# Patient Record
Sex: FEMALE | Race: WHITE
[De-identification: names, ages, dates, MRNs, and addresses within clinical notes are randomized per-mention and may not be internally consistent; named-entity substitution may affect disease eponyms.]

---

## 2017-08-31 ENCOUNTER — HOSPITAL ENCOUNTER (EMERGENCY)
Dept: HOSPITAL 61 - ER | Age: 27
Discharge: HOME | End: 2017-08-31
Payer: SELF-PAY

## 2017-08-31 VITALS — DIASTOLIC BLOOD PRESSURE: 81 MMHG | SYSTOLIC BLOOD PRESSURE: 126 MMHG

## 2017-08-31 VITALS — BODY MASS INDEX: 30.53 KG/M2 | HEIGHT: 66 IN | WEIGHT: 190 LBS

## 2017-08-31 DIAGNOSIS — Z88.6: ICD-10-CM

## 2017-08-31 DIAGNOSIS — G43.909: ICD-10-CM

## 2017-08-31 DIAGNOSIS — Z88.2: ICD-10-CM

## 2017-08-31 DIAGNOSIS — J45.909: ICD-10-CM

## 2017-08-31 DIAGNOSIS — Z88.8: ICD-10-CM

## 2017-08-31 DIAGNOSIS — N39.0: ICD-10-CM

## 2017-08-31 DIAGNOSIS — Z88.1: ICD-10-CM

## 2017-08-31 DIAGNOSIS — J06.9: Primary | ICD-10-CM

## 2017-08-31 LAB
BACTERIA #/AREA URNS HPF: (no result) /HPF
BILIRUB UR QL STRIP: NEGATIVE
GLUCOSE UR STRIP-MCNC: NEGATIVE MG/DL
NITRITE UR QL STRIP: NEGATIVE
PH UR STRIP: 6 [PH]
PROT UR STRIP-MCNC: NEGATIVE MG/DL
RBC #/AREA URNS HPF: (no result) /HPF (ref 0–2)
SP GR UR STRIP: 1.02
SQUAMOUS #/AREA URNS LPF: (no result) /LPF
UROBILINOGEN UR-MCNC: 0.2 MG/DL
WBC #/AREA URNS HPF: (no result) /HPF (ref 0–4)

## 2017-08-31 PROCEDURE — 70450 CT HEAD/BRAIN W/O DYE: CPT

## 2017-08-31 PROCEDURE — 87086 URINE CULTURE/COLONY COUNT: CPT

## 2017-08-31 PROCEDURE — 81001 URINALYSIS AUTO W/SCOPE: CPT

## 2017-08-31 PROCEDURE — 99285 EMERGENCY DEPT VISIT HI MDM: CPT

## 2017-08-31 PROCEDURE — 81025 URINE PREGNANCY TEST: CPT

## 2017-08-31 PROCEDURE — 94250: CPT

## 2017-08-31 PROCEDURE — 94640 AIRWAY INHALATION TREATMENT: CPT

## 2017-08-31 NOTE — PHYS DOC
Past Medical History


Past Medical History:  Asthma, UTI, Other


Additional Past Medical Histor:  MOOD DISORDER, TREMOR R ARM, TACHYCARDIA


Past Surgical History:  No Surgical History


Alcohol Use:  Occasionally


Drug Use:  None





Adult General


Chief Complaint


Chief Complaint:  BACK PAIN - NO INJURY





[facility]


HPI





Patient is a 27  year old female presents to the emergency department stating 

that she has been having a headache on and off for the last month. She states 

that she's been having a migraine. She also states that whenever she bends her 

head down that she feels as though her vision goes completely dark. She denies 

any photophobia. She does state that she has an area on her left parietal area 

feels numb and tingly occasionally. Patient also states that she's been having 

bilateral back pain for the last 2 weeks. She denies any urinary frequency 

urgency or pain with urination denies any blood in her urine. Denies any foul 

odor. She denies any fever, chills or any nausea vomiting. Patient states she's 

been taken over-the-counter medications for the headache and the pain.





Review of Systems


Review of Systems





Constitutional: Denies fever or chills []


Eyes: Denies change in visual acuity, redness, or eye pain []


HENT:  nasal congestion denies sore throat []


Respiratory:  cough denies shortness of breath []


Cardiovascular: No additional information not addressed in HPI []


GI: Denies abdominal pain, nausea, vomiting, bloody stools or diarrhea []


: Denies dysuria or hematuria []


Musculoskeletal: Bilateral flank back pain denies joint pain []


Integument: Denies rash or skin lesions []


Neurologic: Denies headache, focal weakness or sensory changes []


Endocrine: Denies polyuria or polydipsia []





Current Medications


Current Medications





Current Medications








 Medications


  (Trade)  Dose


 Ordered  Sig/Grecia  Start Time


 Stop Time Status Last Admin


Dose Admin


 


 Albuterol/


 Ipratropium


  (Duoneb)  3 ml  1X  ONCE  17 10:00


 17 10:01 DC 17 10:01


3 ML











Allergies


Allergies





Allergies








Coded Allergies Type Severity Reaction Last Updated Verified


 


  Sulfa (Sulfonamide Antibiotics) Allergy Intermediate RASH 17 Yes


 


  levofloxacin Allergy Intermediate HIVES 17 Yes


 


  promethazine Allergy Mild NAUSEA 17 Yes


 


  ibuprofen Allergy Unknown  17 Yes











Physical Exam


Physical Exam





Constitutional: Well developed, well nourished, no acute distress, non-toxic 

appearance. []


HENT: Normocephalic, atraumatic, bilateral external ears normal, oropharynx 

moist, no oral exudates, nose normal. Bilateral tympanic membranes appear to be 

normal. Throat appears to be without erythematous redness drainage or discharge 

noted. No frontal maxillary sinus tenderness noted.


Eyes: PERRLA, EOMI, conjunctiva normal, no discharge. [] 


Neck: Normal range of motion, no tenderness, supple, no stridor. [] 


Cardiovascular:Heart rate regular rhythm, no murmur []


Lungs & Thorax:  Bilateral breath sounds clear to auscultation []


Skin: Warm, dry, no erythema, no rash. [] 


Back: No tenderness, bilateral CVA tenderness. [] 


Extremities: No tenderness, no cyanosis, no clubbing, ROM intact, no edema. [] 


Neurologic: Alert and oriented X 3, normal motor function, normal sensory 

function, no focal deficits noted. []


Psychologic: Affect normal, judgement normal, mood normal. []





Current Patient Data


Vital Signs





 Vital Signs








  Date Time  Temp Pulse Resp B/P (MAP) Pulse Ox O2 Delivery O2 Flow Rate FiO2


 


17 10:03     99 Room Air  


 


17 09:39 98.4 98 18     





 98.4       








Lab Values





 Laboratory Tests








Test


  17


08:59 17


09:40


 


POC Urine HCG, Qualitative


  Hcg negative


(Negative) 


 


 


Urine Collection Type  Unknown  


 


Urine Color  Yellow  


 


Urine Clarity  Cloudy  


 


Urine pH  6.0  


 


Urine Specific Gravity  1.025  


 


Urine Protein


  


  Negative mg/dL


(NEG-TRACE)


 


Urine Glucose (UA)


  


  Negative mg/dL


(NEG)


 


Urine Ketones (Stick)


  


  15 mg/dL (NEG)


 


 


Urine Blood


  


  Negative (NEG)


 


 


Urine Nitrite


  


  Negative (NEG)


 


 


Urine Bilirubin


  


  Negative (NEG)


 


 


Urine Urobilinogen Dipstick


  


  0.2 mg/dL (0.2


mg/dL)


 


Urine Leukocyte Esterase


  


  Moderate (NEG)


 


 


Urine RBC


  


  1-2 /HPF (0-2)


 


 


Urine WBC


  


  5-10 /HPF


(0-4)


 


Urine Squamous Epithelial


Cells 


  Mod /LPF  


 


 


Urine Bacteria


  


  Moderate /HPF


(0-FEW)











EKG


EKG


[]





Radiology/Procedures


Radiology/Procedures


[][facility]


 [address] [place] [address] [phone]


 


 IMAGING REPORT





 Signed





PATIENT: MARIAMA HUDDLESTON ACCOUNT: [id number] MRN#: [id number]


: 1990 LOCATION: ER AGE: 27


SEX: F EXAM DT: 17 ACCESSION#: [id number]


STATUS: REG ER ORD. PHYSICIAN: AMAURI UNDERWOOD 


REASON: C/o visual problems with headaches


PROCEDURE: CT HEAD WO CONTRAST








CT scan of the head without contrast 2017





Clinical history: Headache with visual problems for one month.





Technique: Unenhanced, contiguous, 5 mm axial sections were obtained through


the head. 





One or more of the following individualized dose reduction techniques were


utilized for this study:





1. Automated exposure control.


2. Adjustment of the mA and/or kV according to patient size.


3. Use of iterative reconstruction technique.





Findings: The ventricles and sulci are within normal limits in size and


configuration.  No area of abnormal attenuation is involving the brain


parenchyma.  No extra-axial fluid collection is seen.  No skull fracture is


noted. 





Impression: Negative study.














DICTATED and SIGNED BY:     LYNDA LIVINGSTON MD


DATE:     17 1125





CC: AMAURI UNDERWOOD; NO PCP; NON,STAFF ~





Course & Med Decision Making


Course & Med Decision Making


Pertinent Labs and Imaging studies reviewed. (See chart for details)


Patient's urine was positive for urinary tract infection. Patient does have an 

upper respiratory infection as well. Patient also is complaining of a headache 

here in the emergency Department with CT scan being negative. Patient will be 

encouraged to rest and follow-up with her primary care physician. Patient be 

provided a prescription for prednisone to help with inflammation. Patient will 

be discharged home in stable condition signs and symptoms to return back to 

emergency department as been provided. Patient was also encouraged to follow-up 

with a neurologist in regards to her vision issues when she bends her head 

down. Patient agrees with discharge instructions, treatment regimens and follow-

up recommendations. All questions and concerns been answered at patient's 

bedside





Dragon Disclaimer


Dragon Disclaimer


This electronic medical record was generated, in whole or in part, using a 

voice recognition dictation system.





Departure


Departure


Impression:  


 Primary Impression:  


 URI (upper respiratory infection)


 Additional Impression:  


 UTI (urinary tract infection)


Disposition:   HOME, SELF-CARE


Condition:  STABLE


Referrals:  


NO PCP (PCP)


Patient Instructions:  Upper Respiratory Infection, Adult, Easy-to-Read, 

Urinary Tract Infection, Easy-to-Read





Additional Instructions:  


Activity as tolerated.


Medication as prescribed.


Follow-up with your primary care physician within the next week.


It is advisable that you follow-up with a neurologist in regards to your vision 

issues when you're pending her head.


Return back to emergency department sign symptoms of become worse.


Scripts


Prednisone (PREDNISONE) 20 Mg Tablet


40 MG PO DAILY for 7 Days, #14 TAB


   Prov: AMAURI UNDERWOOD         17 


Cephalexin (CEPHALEXIN) 500 Mg Tablet


1 TAB PO BID, #20 TAB


   Prov: AMAURI UNDERWOOD         17





Problem Qualifiers








 Primary Impression:  


 URI (upper respiratory infection)


 URI type:  unspecified URI  Qualified Codes:  J06.9 - Acute upper respiratory 

infection, unspecified


 Additional Impression:  


 UTI (urinary tract infection)


 Urinary tract infection type:  site unspecified  Hematuria presence:  without 

hematuria  Qualified Codes:  N39.0 - Urinary tract infection, site not specified








AMAURI UNDERWOOD Aug 31, 2017 09:53

## 2017-08-31 NOTE — RAD
CT scan of the head without contrast 8/31/2017



Clinical history: Headache with visual problems for one month.



Technique: Unenhanced, contiguous, 5 mm axial sections were obtained through

the head. 



One or more of the following individualized dose reduction techniques were

utilized for this study:



1. Automated exposure control.

2. Adjustment of the mA and/or kV according to patient size.

3. Use of iterative reconstruction technique.



Findings: The ventricles and sulci are within normal limits in size and

configuration.  No area of abnormal attenuation is involving the brain

parenchyma.  No extra-axial fluid collection is seen.  No skull fracture is

noted. 



Impression: Negative study.

## 2017-12-18 ENCOUNTER — HOSPITAL ENCOUNTER (EMERGENCY)
Dept: HOSPITAL 61 - ER | Age: 27
Discharge: HOME | End: 2017-12-18
Payer: SELF-PAY

## 2017-12-18 VITALS — WEIGHT: 185 LBS | BODY MASS INDEX: 29.73 KG/M2 | HEIGHT: 66 IN

## 2017-12-18 VITALS — SYSTOLIC BLOOD PRESSURE: 105 MMHG | DIASTOLIC BLOOD PRESSURE: 67 MMHG

## 2017-12-18 DIAGNOSIS — Z88.8: ICD-10-CM

## 2017-12-18 DIAGNOSIS — Z87.440: ICD-10-CM

## 2017-12-18 DIAGNOSIS — F41.9: ICD-10-CM

## 2017-12-18 DIAGNOSIS — Z88.6: ICD-10-CM

## 2017-12-18 DIAGNOSIS — Z88.2: ICD-10-CM

## 2017-12-18 DIAGNOSIS — J09.X2: Primary | ICD-10-CM

## 2017-12-18 DIAGNOSIS — J45.909: ICD-10-CM

## 2017-12-18 DIAGNOSIS — Z88.1: ICD-10-CM

## 2017-12-18 DIAGNOSIS — F32.9: ICD-10-CM

## 2017-12-18 LAB
ALBUMIN SERPL-MCNC: 3.8 G/DL (ref 3.4–5)
ALBUMIN/GLOB SERPL: 1.1 {RATIO} (ref 1–1.7)
ALP SERPL-CCNC: 87 U/L (ref 46–116)
ALT SERPL-CCNC: 15 U/L (ref 14–59)
ANION GAP SERPL CALC-SCNC: 14 MMOL/L (ref 6–14)
AST SERPL-CCNC: 19 U/L (ref 15–37)
BASOPHILS # BLD AUTO: 0 X10^3/UL (ref 0–0.2)
BASOPHILS NFR BLD: 0 % (ref 0–3)
BILIRUB SERPL-MCNC: 0.5 MG/DL (ref 0.2–1)
BUN SERPL-MCNC: 6 MG/DL (ref 7–20)
BUN/CREAT SERPL: 6 (ref 6–20)
CALCIUM SERPL-MCNC: 8.5 MG/DL (ref 8.5–10.1)
CHLORIDE SERPL-SCNC: 105 MMOL/L (ref 98–107)
CO2 SERPL-SCNC: 22 MMOL/L (ref 21–32)
CREAT SERPL-MCNC: 1 MG/DL (ref 0.6–1)
EOSINOPHIL NFR BLD: 0 % (ref 0–3)
ERYTHROCYTE [DISTWIDTH] IN BLOOD BY AUTOMATED COUNT: 13.4 % (ref 11.5–14.5)
GFR SERPLBLD BASED ON 1.73 SQ M-ARVRAT: 67.6 ML/MIN
GLOBULIN SER-MCNC: 3.5 G/DL (ref 2.2–3.8)
GLUCOSE SERPL-MCNC: 90 MG/DL (ref 70–99)
HCT VFR BLD CALC: 41.4 % (ref 36–47)
HGB BLD-MCNC: 13.9 G/DL (ref 12–15.5)
LYMPHOCYTES # BLD: 0.7 X10^3/UL (ref 1–4.8)
LYMPHOCYTES NFR BLD AUTO: 9 % (ref 24–48)
MCH RBC QN AUTO: 29 PG (ref 25–35)
MCHC RBC AUTO-ENTMCNC: 34 G/DL (ref 31–37)
MCV RBC AUTO: 87 FL (ref 79–100)
MONOCYTES NFR BLD: 12 % (ref 0–9)
NEG OBC SER: (no result)
NEUTROPHILS NFR BLD AUTO: 78 % (ref 31–73)
OBC FLU: (no result)
PLATELET # BLD AUTO: 176 X10^3/UL (ref 140–400)
POS OBC SER: (no result)
POTASSIUM SERPL-SCNC: 3.8 MMOL/L (ref 3.5–5.1)
PROT SERPL-MCNC: 7.3 G/DL (ref 6.4–8.2)
RBC # BLD AUTO: 4.76 X10^6/UL (ref 3.5–5.4)
SODIUM SERPL-SCNC: 141 MMOL/L (ref 136–145)
WBC # BLD AUTO: 7.7 X10^3/UL (ref 4–11)

## 2017-12-18 PROCEDURE — 99284 EMERGENCY DEPT VISIT MOD MDM: CPT

## 2017-12-18 PROCEDURE — 84703 CHORIONIC GONADOTROPIN ASSAY: CPT

## 2017-12-18 PROCEDURE — 81025 URINE PREGNANCY TEST: CPT

## 2017-12-18 PROCEDURE — 96360 HYDRATION IV INFUSION INIT: CPT

## 2017-12-18 PROCEDURE — 87804 INFLUENZA ASSAY W/OPTIC: CPT

## 2017-12-18 PROCEDURE — 85025 COMPLETE CBC W/AUTO DIFF WBC: CPT

## 2017-12-18 PROCEDURE — 36415 COLL VENOUS BLD VENIPUNCTURE: CPT

## 2017-12-18 PROCEDURE — 80053 COMPREHEN METABOLIC PANEL: CPT

## 2017-12-18 NOTE — PHYS DOC
Past Medical History


Past Medical History:  Anxiety, Asthma, Depression, UTI, Other


Additional Past Medical Histor:  MOOD DISORDER, TREMOR R ARM, TACHYCARDIA


Past Surgical History:  No Surgical History


Alcohol Use:  Occasionally


Drug Use:  None





Adult General


Chief Complaint


Chief Complaint:  FLU SYMPTOM





HPI


HPI





Patient is a 27  year old female who presents with fever, sore throat, diffuse 

myalgias, 3 days. Temperature is 104.9 last evening. Patient last vomited 

earlier this morning. Denies dizziness lightheadedness. No abdominal pain. No 

other acute symptoms or complaints. Last menstrual period 10 days ago. Patient 

is not currently on birth control. []





Review of Systems


Review of Systems





His symptoms as per history of present illness. All other review symptoms are 

negative.


All other systems were reviewed and found to be within normal limits, except as 

documented in this note.





Current Medications


Current Medications





Current Medications








 Medications


  (Trade)  Dose


 Ordered  Sig/Grecia  Start Time


 Stop Time Status Last Admin


Dose Admin


 


 Morphine Sulfate  5 mg  1X  ONCE  12/18/17 17:45


 12/18/17 17:46 DC  


 


 


 Ondansetron HCl


  (Zofran)  4 mg  1X  ONCE  12/18/17 17:45


 12/18/17 17:46 DC  


 


 


 Sodium Chloride  1,000 ml @ 


 1,000 mls/hr  1X  ONCE  12/18/17 17:45


 12/18/17 18:44  12/18/17 17:45


1,000 MLS/HR











Allergies


Allergies





Allergies








Coded Allergies Type Severity Reaction Last Updated Verified


 


  Sulfa (Sulfonamide Antibiotics) Allergy Intermediate RASH 8/31/17 Yes


 


  levofloxacin Allergy Intermediate HIVES 8/31/17 Yes


 


  promethazine Allergy Mild NAUSEA 8/31/17 Yes


 


  ibuprofen Allergy Unknown  8/31/17 Yes











Physical Exam


Physical Exam





Constitutional: Well developed, well nourished,, uncomfortable and sick 

appearing. []


HENT: Normocephalic, atraumatic, bilateral external ears normal, oropharynx, 

mild pharyngeal erythema, no oral exudates, nose normal. []


Eyes: PERRLA, EOMI, conjunctiva normal, no discharge. [] 


Neck: Normal range of motion, no tenderness, supple, no stridor. [] 


Cardiovascular: Tachycardia[]


Lungs & Thorax:  Bilateral breath sounds clear to auscultation []


Abdomen: Bowel sounds normal, soft, no tenderness, no masses, no pulsatile 

masses. [] 


Skin: Warm, dry, no erythema, no rash. [] 


Back: No tenderness, no CVA tenderness. [] 


Extremities: No tenderness, no cyanosis, no clubbing, ROM intact, no edema. [] 


Neurologic: Alert and oriented X 3, normal motor function, normal sensory 

function, no focal deficits noted. []


Psychologic: Affect normal, judgement normal, mood normal. []





Current Patient Data


Vital Signs





 Vital Signs








  Date Time  Temp Pulse Resp B/P (MAP) Pulse Ox O2 Delivery O2 Flow Rate FiO2


 


12/18/17 17:24 102.5 144 18 125/53 (77) 96 Room Air  





 102.5       








Lab Values





 Laboratory Tests








Test


  12/18/17


17:30 12/18/17


17:45 12/18/17


17:46


 


Influenza Type A Antigen


  Positive


(NEGATIVE) 


  


 


 


Influenza Type B Antigen


  Negative


(NEGATIVE) 


  


 


 


White Blood Count


  


  7.7 x10^3/uL


(4.0-11.0) 


 


 


Red Blood Count


  


  4.76 x10^6/uL


(3.50-5.40) 


 


 


Hemoglobin


  


  13.9 g/dL


(12.0-15.5) 


 


 


Hematocrit


  


  41.4 %


(36.0-47.0) 


 


 


Mean Corpuscular Volume


  


  87 fL ()


  


 


 


Mean Corpuscular Hemoglobin  29 pg (25-35)   


 


Mean Corpuscular Hemoglobin


Concent 


  34 g/dL


(31-37) 


 


 


Red Cell Distribution Width


  


  13.4 %


(11.5-14.5) 


 


 


Platelet Count


  


  176 x10^3/uL


(140-400) 


 


 


Neutrophils (%) (Auto)  78 % (31-73)  H 


 


Lymphocytes (%) (Auto)  9 % (24-48)  L 


 


Monocytes (%) (Auto)  12 % (0-9)  H 


 


Eosinophils (%) (Auto)  0 % (0-3)   


 


Basophils (%) (Auto)  0 % (0-3)   


 


Neutrophils # (Auto)


  


  6.0 x10^3uL


(1.8-7.7) 


 


 


Lymphocytes # (Auto)


  


  0.7 x10^3/uL


(1.0-4.8)  L 


 


 


Monocytes # (Auto)


  


  1.0 x10^3/uL


(0.0-1.1) 


 


 


Eosinophils # (Auto)


  


  0.0 x10^3/uL


(0.0-0.7) 


 


 


Basophils # (Auto)


  


  0.0 x10^3/uL


(0.0-0.2) 


 


 


POC Urine HCG, Qualitative


  


  


  Hcg negative


(Negative)





 Laboratory Tests


12/18/17 17:45














EKG


EKG


[]





Radiology/Procedures


Radiology/Procedures


[]





Course & Med Decision Making


Course & Med Decision Making


Pertinent Labs and Imaging studies reviewed. (See chart for details)





[Patient influenza positive. IV fluids pain medication given with symptomatic 

relief. Recommend continued supportive care and PCP follow-up. Return 

precautions reviewed. Patient verbalizes understanding agreement prior to 

departure.]





Dragon Disclaimer


Dragon Disclaimer


This electronic medical record was generated, in whole or in part, using a 

voice recognition dictation system.





Departure


Departure


Impression:  


 Primary Impression:  


 Influenza A


Disposition:  01 HOME, SELF-CARE


Condition:  GOOD


Referrals:  


NO PCP (PCP)


Patient Instructions:  Influenza A (H1N1)





Additional Instructions:  


You were evaluated in the emergency department for fever body aches nausea and 

vomiting. Lab work was obtained and positive for influenza A. Influenza is a 

viral illness that typically lasts between 3-5 days. Please go home and rest, 

take ibuprofen for pain and hydrocodone as needed for additional relief. Take 

nausea medication as directed. Please follow-up with your physician in 3-5 days 

if symptoms persist. In the meantime, if you develop new or worsening symptoms, 

please return to the emergency department.











VIRAJ MARLEY DO Dec 18, 2017 18:11

## 2018-05-05 ENCOUNTER — HOSPITAL ENCOUNTER (EMERGENCY)
Dept: HOSPITAL 63 - ER | Age: 28
Discharge: HOME | End: 2018-05-05
Payer: SELF-PAY

## 2018-05-05 VITALS — SYSTOLIC BLOOD PRESSURE: 106 MMHG | DIASTOLIC BLOOD PRESSURE: 78 MMHG

## 2018-05-05 VITALS — WEIGHT: 180 LBS | BODY MASS INDEX: 28.93 KG/M2 | HEIGHT: 66 IN

## 2018-05-05 DIAGNOSIS — S90.32XA: Primary | ICD-10-CM

## 2018-05-05 DIAGNOSIS — Z88.1: ICD-10-CM

## 2018-05-05 DIAGNOSIS — J45.909: ICD-10-CM

## 2018-05-05 DIAGNOSIS — Z88.8: ICD-10-CM

## 2018-05-05 DIAGNOSIS — Z88.2: ICD-10-CM

## 2018-05-05 DIAGNOSIS — F32.9: ICD-10-CM
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
## 2018-05-05 DIAGNOSIS — F41.9: ICD-10-CM
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
## 2018-05-05 DIAGNOSIS — Z88.6: ICD-10-CM

## 2018-05-05 PROCEDURE — 73630 X-RAY EXAM OF FOOT: CPT

## 2018-05-05 PROCEDURE — 99284 EMERGENCY DEPT VISIT MOD MDM: CPT

## 2018-05-05 NOTE — ED.ADGEN
Past History


Past Medical History:  Anxiety, Asthma, Depression


Past Surgical History:  No Surgical History


Alcohol Use:  Rarely


Drug Use:  None





Adult General


Chief Complaint


Chief Complaint


Foot injury





HPI


HPI





Patient is a pain tenderness and swelling over the dorsal surface of left 

midfoot v hitting her foot on the corner of her dresser last evening. Patient 

reports persistent pain. On exam, there is soft tissue swelling, punctate 

abrasion over impact region. No gross deformities, bony tenderness. Patient 

wearing flip-flops[]





Review of Systems


Review of Systems


ROS as per HPI. 


All other systems were reviewed and found to be within normal limits, except as 

documented in this note.





Current Medications


Current Medications





Current Medications








 Medications


  (Trade)  Dose


 Ordered  Sig/Grecia  Start Time


 Stop Time Status Last Admin


Dose Admin


 


 Acetaminophen


  (Tylenol)  1,000 mg  1X  ONCE  5/5/18 07:30


 5/5/18 07:31 DC  


 


 


 Ibuprofen


  (Motrin)  600 mg  1X  ONCE  5/5/18 07:00


 5/5/18 07:00 DC  


 











Allergies


Allergies





Allergies








Coded Allergies Type Severity Reaction Last Updated Verified


 


  Sulfa (Sulfonamide Antibiotics) Allergy Unknown  5/5/18 Yes


 


  ibuprofen Allergy Unknown  5/5/18 Yes


 


  levofloxacin Allergy Unknown  5/5/18 Yes


 


  promethazine Allergy Unknown  5/5/18 Yes











Physical Exam


Physical Exam





Constitutional: Well developed, well nourished, no acute distress, non-toxic 

appearance. []


Extremities: Left foot, dorsal surface, punctate abrasion with minimal soft 

tissue swelling, surrounding soft tissue tenderness. No deformity. Range of 

motion intact. []





Current Patient Data


Vital Signs





 Vital Signs








  Date Time  Temp Pulse Resp B/P (MAP) Pulse Ox O2 Delivery O2 Flow Rate FiO2


 


5/5/18 06:40 97.9 81 20  95 Room Air  











EKG


EKG


[]





Radiology/Procedures


Radiology/Procedures


[X-ray left foot: No obvious displaced fracture per radiology report]





Course & Med Decision Making


Course & Med Decision Making


Pertinent Labs and Imaging studies reviewed. (See chart for details)





[No obvious displaced fracture supportive care recommended]





Final Impression


Final Impression


[Left foot contusion]


Problems:  





Dragon Disclaimer


Dragon Disclaimer


This electronic medical record was generated, in whole or in part, using a 

voice recognition dictation system.











VIRAJ MARLEY DO May 5, 2018 10:15

## 2018-05-05 NOTE — RAD
Left foot radiographs

 

History: left foot pain x 1 day, hit foot on hard object

 

Comparison: None.

 

Findings:

3 views left foot are submitted. No acute fracture or dislocation is 

identified.

 

Impression: 

 

1.  No acute osseous abnormality is identified by radiographs.

 

Electronically signed by: Andrew Herron MD (5/5/2018 9:35 AM) [id number]

## 2018-10-11 ENCOUNTER — HOSPITAL ENCOUNTER (EMERGENCY)
Dept: HOSPITAL 61 - ER | Age: 28
LOS: 1 days | Discharge: HOME | End: 2018-10-12
Payer: SELF-PAY

## 2018-10-11 VITALS — DIASTOLIC BLOOD PRESSURE: 56 MMHG | SYSTOLIC BLOOD PRESSURE: 105 MMHG

## 2018-10-11 VITALS — BODY MASS INDEX: 29.73 KG/M2 | WEIGHT: 185 LBS | HEIGHT: 66 IN

## 2018-10-11 DIAGNOSIS — J45.909: ICD-10-CM

## 2018-10-11 DIAGNOSIS — Z3A.01: ICD-10-CM

## 2018-10-11 DIAGNOSIS — O99.341: ICD-10-CM

## 2018-10-11 DIAGNOSIS — Z88.6: ICD-10-CM

## 2018-10-11 DIAGNOSIS — Z88.2: ICD-10-CM

## 2018-10-11 DIAGNOSIS — Z88.8: ICD-10-CM

## 2018-10-11 DIAGNOSIS — F41.8: ICD-10-CM

## 2018-10-11 DIAGNOSIS — D72.829: ICD-10-CM

## 2018-10-11 DIAGNOSIS — O99.511: ICD-10-CM

## 2018-10-11 DIAGNOSIS — O03.9: Primary | ICD-10-CM

## 2018-10-11 PROCEDURE — 76856 US EXAM PELVIC COMPLETE: CPT

## 2018-10-11 PROCEDURE — 81001 URINALYSIS AUTO W/SCOPE: CPT

## 2018-10-11 PROCEDURE — 86850 RBC ANTIBODY SCREEN: CPT

## 2018-10-11 PROCEDURE — 86900 BLOOD TYPING SEROLOGIC ABO: CPT

## 2018-10-11 PROCEDURE — 76830 TRANSVAGINAL US NON-OB: CPT

## 2018-10-11 PROCEDURE — 84702 CHORIONIC GONADOTROPIN TEST: CPT

## 2018-10-11 PROCEDURE — 85025 COMPLETE CBC W/AUTO DIFF WBC: CPT

## 2018-10-11 PROCEDURE — 86901 BLOOD TYPING SEROLOGIC RH(D): CPT

## 2018-10-11 PROCEDURE — 80053 COMPREHEN METABOLIC PANEL: CPT

## 2018-10-11 PROCEDURE — 36415 COLL VENOUS BLD VENIPUNCTURE: CPT

## 2018-10-11 PROCEDURE — 85610 PROTHROMBIN TIME: CPT

## 2018-10-11 PROCEDURE — 99285 EMERGENCY DEPT VISIT HI MDM: CPT

## 2018-10-11 NOTE — PHYS DOC
Past Medical History


Past Medical History:  Anxiety, Asthma, Depression, UTI, Other


Additional Past Medical Histor:  MOOD DISORDER, TREMOR R ARM, TACHYCARDIA


Past Surgical History:  No Surgical History


Alcohol Use:  Occasionally


Drug Use:  None


Additional Procedures


Progress


Pelvic exam with chaperone RAQUEL Mckeon


Genitalia normal morphology with blood at the introitus


Exam with blood in the vault moderate clots. There are products of conception 

at the os. I successfully removed POC from the os with ring forceps and placed 

in a specimen container. Once


products of conception were removed there is no bleeding seen from the os.


Exam external os is open internal os is closed has mild uterine tenderness on 

palpation no adnexal masses or tenderness appreciated





Adult General


Chief Complaint


Chief Complaint:  VAGINAL BLEEDING PREGNANCY





[facility]


HPI





Patient is a 28  year old female who presents with pelvic pain and vaginal 

bleeding she is 6 weeks pregnant


She was seen at [facility] 2 days ago was given pills for elective 

. She started bleeding this evening at 5:00pm and his had severe 

bleeding since then. She's passed more than 20 large clots and is having 

profuse bleeding with pain. She presents for evaluation. Her blood type is O+ 

per her report.





Review of Systems


Review of Systems





Constitutional: Denies fever or chills 


Eyes: Denies change in visual acuity, redness, or eye pain 


HENT: Denies nasal congestion or sore throat 


Respiratory: Denies cough or shortness of breath 


Cardiovascular: No additional information not addressed in HPI 


GI: with lower abdominal pain, nausea, no vomiting, bloody stools or diarrhea 


: Denies dysuria or hematuria, with vaginal bleeding


Musculoskeletal: Denies back pain or joint pain 


Integument: Denies rash or skin lesions 


Neurologic: Denies headache, focal weakness or sensory changes 


Endocrine: Denies polyuria or polydipsia 





All other systems were reviewed and found to be within normal limits, except as 

documented in this note.





Current Medications


Current Medications





Current Medications








 Medications


  (Trade)  Dose


 Ordered  Sig/Grecia  Start Time


 Stop Time Status Last Admin


Dose Admin


 


 Acetaminophen


  (Tylenol)  1,000 mg  1X  ONCE  10/12/18 03:00


 10/12/18 03:01 DC 10/12/18 02:57


1,000 MG


 


 Sodium Chloride  1,000 ml @ 


 1,000 mls/hr  1X  ONCE  10/12/18 03:00


 10/12/18 03:59 DC 10/12/18 03:02


1,000 MLS/HR











Allergies


Allergies





Allergies








Coded Allergies Type Severity Reaction Last Updated Verified


 


  Sulfa (Sulfonamide Antibiotics) Allergy Intermediate RASH 17 Yes


 


  ibuprofen Allergy Intermediate  10/11/18 Yes


 


  levofloxacin Allergy Intermediate HIVES 17 Yes


 


  promethazine Allergy Mild NAUSEA 17 Yes











Physical Exam


Physical Exam





Constitutional: Well developed, well nourished, no acute distress, non-toxic 

appearance.


HENT: Normocephalic, atraumatic, bilateral external ears normal, oropharynx 

moist, no oral exudates, nose normal.


Eyes: PERRLA, EOMI, conjunctiva normal, no discharge. 


Neck: Normal range of motion, no tenderness, supple, no stridor.  


Cardiovascular:Heart rate regular rhythm, no murmur


Lungs & Thorax:  Bilateral breath sounds clear to auscultation


Abdomen: Bowel sounds normal, soft, no tenderness, no masses, no pulsatile 

masses.


Pelvic exam:


see procedure note


Skin: Warm, dry, no erythema, no rash.


Back: No tenderness, no CVA tenderness.


Extremities: No tenderness, no cyanosis, no clubbing, ROM intact, no edema. 


Neurologic: Alert and oriented X 3, normal motor function, normal sensory 

function, no focal deficits noted.


Psychologic: Affect normal, judgement normal, mood normal.





Current Patient Data


Vital Signs





 Vital Signs








  Date Time  Temp Pulse Resp B/P (MAP) Pulse Ox O2 Delivery O2 Flow Rate FiO2


 


10/11/18 22:54 98.0 116 20 105/56 (72) 100 Room Air  





 98.0       








Lab Values





 Laboratory Tests








Test


 10/12/18


00:18 10/12/18


02:58


 


White Blood Count


 18.6 x10^3/uL


(4.0-11.0)  H 





 


Red Blood Count


 4.62 x10^6/uL


(3.50-5.40) 





 


Hemoglobin


 14.0 g/dL


(12.0-15.5) 





 


Hematocrit


 41.0 %


(36.0-47.0) 





 


Mean Corpuscular Volume


 89 fL ()


 





 


Mean Corpuscular Hemoglobin 30 pg (25-35)   


 


Mean Corpuscular Hemoglobin


Concent 34 g/dL


(31-37) 





 


Red Cell Distribution Width


 13.2 %


(11.5-14.5) 





 


Platelet Count


 284 x10^3/uL


(140-400) 





 


Neutrophils (%) (Auto) 81 % (31-73)  H 


 


Lymphocytes (%) (Auto) 14 % (24-48)  L 


 


Monocytes (%) (Auto) 5 % (0-9)   


 


Eosinophils (%) (Auto) 0 % (0-3)   


 


Basophils (%) (Auto) 0 % (0-3)   


 


Neutrophils # (Auto)


 15.0 x10^3uL


(1.8-7.7)  H 





 


Lymphocytes # (Auto)


 2.5 x10^3/uL


(1.0-4.8) 





 


Monocytes # (Auto)


 1.0 x10^3/uL


(0.0-1.1) 





 


Eosinophils # (Auto)


 0.0 x10^3/uL


(0.0-0.7) 





 


Basophils # (Auto)


 0.1 x10^3/uL


(0.0-0.2) 





 


Prothrombin Time


 13.3 SEC


(11.7-14.0) 





 


Prothrombin Time INR 1.1 (0.8-1.1)   


 


Maternal Serum HCG Beta


Subunit 666298 mIU/mL


(0-5)  H 





 


Sodium Level


 139 mmol/L


(136-145) 





 


Potassium Level


 3.9 mmol/L


(3.5-5.1) 





 


Chloride Level


 102 mmol/L


() 





 


Carbon Dioxide Level


 24 mmol/L


(21-32) 





 


Anion Gap 13 (6-14)   


 


Blood Urea Nitrogen


 10 mg/dL


(7-20) 





 


Creatinine


 0.9 mg/dL


(0.6-1.0) 





 


Estimated GFR


(Cockcroft-Gault) 75.7  


 





 


BUN/Creatinine Ratio 11 (6-20)   


 


Glucose Level


 99 mg/dL


(70-99) 





 


Calcium Level


 9.7 mg/dL


(8.5-10.1) 





 


Total Bilirubin


 0.7 mg/dL


(0.2-1.0) 





 


Aspartate Amino Transferase


(AST) 21 U/L (15-37)


 





 


Alanine Aminotransferase (ALT)


 20 U/L (14-59)


 





 


Alkaline Phosphatase


 85 U/L


() 





 


Total Protein


 7.1 g/dL


(6.4-8.2) 





 


Albumin


 3.7 g/dL


(3.4-5.0) 





 


Albumin/Globulin Ratio 1.1 (1.0-1.7)   


 


Urine Collection Type  Unknown  


 


Urine Color  Red  


 


Urine Clarity  Bloody  


 


Urine pH  5.5  


 


Urine Specific Gravity  >=1.030  


 


Urine Protein


 


 >=300 mg/dL


(NEG-TRACE)


 


Urine Glucose (UA)


 


 Negative mg/dL


(NEG)


 


Urine Ketones (Stick)


 


 >=80 mg/dL


(NEG)


 


Urine Blood  Large (NEG)  


 


Urine Nitrite


 


 Negative (NEG)





 


Urine Bilirubin  Small (NEG)  


 


Urine Urobilinogen Dipstick


 


 1.0 mg/dL (0.2


mg/dL)


 


Urine Leukocyte Esterase  Trace (NEG)  


 


Urine RBC


 


 Tntc /HPF


(0-2)


 


Urine WBC


 


 1-4 /HPF (0-4)





 


Urine Squamous Epithelial


Cells 


 Occ /LPF  





 


Urine Bacteria


 


 0 /HPF (0-FEW)








 Laboratory Tests


10/12/18 00:18








 Laboratory Tests


10/12/18 00:18











EKG


EKG


[]





Radiology/Procedures


Radiology/Procedures


 [facility]


 [address]  [place], [place] [id number] [phone]


 


 IMAGING REPORT





 Signed





PATIENT: MARIAMA HUDDLESTON ACCOUNT: [id number] MRN#: [id number]


: 1990 LOCATION: ER AGE: 28


SEX: F EXAM DT: 10/11/18 ACCESSION#: [id number]


STATUS: REG ER ORD. PHYSICIAN: NORA MILNER MD 


REASON: rule out retained products of conception


PROCEDURE: PELVIS W/TV





Pelvic ultrasound with transvaginal:


 


Reason for examination: Heavy bleeding after taking second  pill 


today.


 


Transabdominal and transvaginal ultrasound examination of the pelvis was 


performed.


 


Transabdominally, the uterus shows no focal lesions. Endometrium is 


thickened. There is a corpus luteal cyst at the left ovary measuring 


approximately 2.4 cm in size.


 


Transvaginally, the uterus measures 9.5 x 4.7 x 5.8 cm in greatest 


dimension. Endometrium is thickened at 3 cm and contains blood flow. Right


ovary measures 2.6 x 2.0 x 1.9 cm in greatest dimension and shows normal 


blood flow. Left ovary measures 3.7 x 2.6 x 3.0 cm in greatest dimension 


and appears to contain a 2 x 1.8 x 1.6 cm corpus luteal cyst. No free 


fluid is seen.


 


IMPRESSION:


 


Thickened endometrium with blood flow to the endometrium. 2 cm corpus 


luteal cyst the left ovary.


 


Electronically signed by: Micyk Tee MD (10/12/2018 12:42 AM) [id number]














DICTATED and SIGNED BY:     MICKY TEE MD


DATE:     10/12/18 0037





Course & Med Decision Making


Course & Med Decision Making


Pertinent Labs and Imaging studies reviewed. (See chart for details)





Emergency Department Course


Patient presents with vaginal bleeding and cramping


DDx- incomplete , vaginal bleeding, retained POC


 


Patient was stable in the ED. POC removed with vaginal exam. Pelvic U/S showed 

no retained POC. Labs remarkable for leukocytosis. Blood type O+. 


Clinically patient has had complete .





02:25


Case discussed with Dr. Caldwell who recommends office follow-up. Patient 

to return to the ED if she develops worse bleeding, return to the ED.





04:00


U/A unremarkable. Patient improved with decreased cramping and bleeding. 

Patient will follow-up with outpatient Gyn evaluation for further evaluation 

and serial BHCG testing. Patient advised to call Gyn office today for follow-up.





Dragon Disclaimer


Dragon Disclaimer


This electronic medical record was generated, in whole or in part, using a 

voice recognition dictation system.





Departure


Departure


Impression:  


 Primary Impression:  


 Complete 


 Additional Impressions:  


 Abnormal vaginal bleeding


 Leukocytosis


Disposition:   HOME, SELF-CARE


Condition:  STABLE


Referrals:  


NO PCP (PCP)








JAYMIE CALDWELL MD


Follow-up for further evaluation and serial BHCG testing. Call today for an 

appointment.


Patient Instructions:  Abnormal Uterine Bleeding, , Prostaglandin-

Induced





Additional Instructions:  


If you develop worse bleeding, pain, fevers, vomiting, shortness of breath 

return to the Emergency Department immediately





Problem Qualifiers








 Additional Impressions:  


 Leukocytosis


 Leukocytosis type:  unspecified  Qualified Codes:  D72.829 - Elevated white 

blood cell count, unspecified








NORA MILNER MD Oct 11, 2018 23:06

## 2018-10-12 LAB
ALBUMIN SERPL-MCNC: 3.7 G/DL (ref 3.4–5)
ALBUMIN/GLOB SERPL: 1.1 {RATIO} (ref 1–1.7)
ALP SERPL-CCNC: 85 U/L (ref 46–116)
ALT SERPL-CCNC: 20 U/L (ref 14–59)
ANION GAP SERPL CALC-SCNC: 13 MMOL/L (ref 6–14)
APTT PPP: (no result) S
AST SERPL-CCNC: 21 U/L (ref 15–37)
BACTERIA #/AREA URNS HPF: 0 /HPF
BASOPHILS # BLD AUTO: 0.1 X10^3/UL (ref 0–0.2)
BASOPHILS NFR BLD: 0 % (ref 0–3)
BILIRUB SERPL-MCNC: 0.7 MG/DL (ref 0.2–1)
BILIRUB UR QL STRIP: (no result)
BUN SERPL-MCNC: 10 MG/DL (ref 7–20)
BUN/CREAT SERPL: 11 (ref 6–20)
CALCIUM SERPL-MCNC: 9.7 MG/DL (ref 8.5–10.1)
CHLORIDE SERPL-SCNC: 102 MMOL/L (ref 98–107)
CO2 SERPL-SCNC: 24 MMOL/L (ref 21–32)
CREAT SERPL-MCNC: 0.9 MG/DL (ref 0.6–1)
EOSINOPHIL NFR BLD: 0 % (ref 0–3)
EOSINOPHIL NFR BLD: 0 X10^3/UL (ref 0–0.7)
ERYTHROCYTE [DISTWIDTH] IN BLOOD BY AUTOMATED COUNT: 13.2 % (ref 11.5–14.5)
FIBRINOGEN PPP-MCNC: (no result) MG/DL
GFR SERPLBLD BASED ON 1.73 SQ M-ARVRAT: 75.7 ML/MIN
GLOBULIN SER-MCNC: 3.4 G/DL (ref 2.2–3.8)
GLUCOSE SERPL-MCNC: 99 MG/DL (ref 70–99)
HCT VFR BLD CALC: 41 % (ref 36–47)
HGB BLD-MCNC: 14 G/DL (ref 12–15.5)
LYMPHOCYTES # BLD: 2.5 X10^3/UL (ref 1–4.8)
LYMPHOCYTES NFR BLD AUTO: 14 % (ref 24–48)
MCH RBC QN AUTO: 30 PG (ref 25–35)
MCHC RBC AUTO-ENTMCNC: 34 G/DL (ref 31–37)
MCV RBC AUTO: 89 FL (ref 79–100)
MONO #: 1 X10^3/UL (ref 0–1.1)
MONOCYTES NFR BLD: 5 % (ref 0–9)
NEUT #: 15 X10^3UL (ref 1.8–7.7)
NEUTROPHILS NFR BLD AUTO: 81 % (ref 31–73)
NITRITE UR QL STRIP: NEGATIVE
PH UR STRIP: 5.5 [PH]
PLATELET # BLD AUTO: 284 X10^3/UL (ref 140–400)
POTASSIUM SERPL-SCNC: 3.9 MMOL/L (ref 3.5–5.1)
PROT SERPL-MCNC: 7.1 G/DL (ref 6.4–8.2)
PROT UR STRIP-MCNC: >=300 MG/DL
PROTHROMBIN TIME: 13.3 SEC (ref 11.7–14)
RBC # BLD AUTO: 4.62 X10^6/UL (ref 3.5–5.4)
RBC #/AREA URNS HPF: (no result) /HPF (ref 0–2)
SODIUM SERPL-SCNC: 139 MMOL/L (ref 136–145)
SQUAMOUS #/AREA URNS LPF: (no result) /LPF
UROBILINOGEN UR-MCNC: 1 MG/DL
WBC # BLD AUTO: 18.6 X10^3/UL (ref 4–11)
WBC #/AREA URNS HPF: (no result) /HPF (ref 0–4)

## 2018-10-12 NOTE — RAD
Pelvic ultrasound with transvaginal:

 

Reason for examination: Heavy bleeding after taking second  pill 

today.

 

Transabdominal and transvaginal ultrasound examination of the pelvis was 

performed.

 

Transabdominally, the uterus shows no focal lesions. Endometrium is 

thickened. There is a corpus luteal cyst at the left ovary measuring 

approximately 2.4 cm in size.

 

Transvaginally, the uterus measures 9.5 x 4.7 x 5.8 cm in greatest 

dimension. Endometrium is thickened at 3 cm and contains blood flow. Right

ovary measures 2.6 x 2.0 x 1.9 cm in greatest dimension and shows normal 

blood flow. Left ovary measures 3.7 x 2.6 x 3.0 cm in greatest dimension 

and appears to contain a 2 x 1.8 x 1.6 cm corpus luteal cyst. No free 

fluid is seen.

 

IMPRESSION:

 

Thickened endometrium with blood flow to the endometrium. 2 cm corpus 

luteal cyst the left ovary.

 

Electronically signed by: Olga Ray MD (10/12/2018 12:42 AM) [id number]

## 2019-06-23 ENCOUNTER — HOSPITAL ENCOUNTER (EMERGENCY)
Dept: HOSPITAL 61 - ER | Age: 29
Discharge: HOME | End: 2019-06-23
Payer: SELF-PAY

## 2019-06-23 VITALS — SYSTOLIC BLOOD PRESSURE: 123 MMHG | DIASTOLIC BLOOD PRESSURE: 68 MMHG

## 2019-06-23 VITALS — WEIGHT: 182 LBS | BODY MASS INDEX: 29.25 KG/M2 | HEIGHT: 66 IN

## 2019-06-23 DIAGNOSIS — O20.0: Primary | ICD-10-CM

## 2019-06-23 DIAGNOSIS — Z3A.01: ICD-10-CM

## 2019-06-23 DIAGNOSIS — O21.8: ICD-10-CM

## 2019-06-23 DIAGNOSIS — Z88.8: ICD-10-CM

## 2019-06-23 DIAGNOSIS — O99.511: ICD-10-CM

## 2019-06-23 DIAGNOSIS — Z88.1: ICD-10-CM

## 2019-06-23 DIAGNOSIS — O23.41: ICD-10-CM

## 2019-06-23 DIAGNOSIS — F41.8: ICD-10-CM

## 2019-06-23 DIAGNOSIS — O99.341: ICD-10-CM

## 2019-06-23 DIAGNOSIS — Z88.6: ICD-10-CM

## 2019-06-23 DIAGNOSIS — J45.909: ICD-10-CM

## 2019-06-23 DIAGNOSIS — Z88.2: ICD-10-CM

## 2019-06-23 LAB
ALBUMIN SERPL-MCNC: 3.7 G/DL (ref 3.4–5)
ALBUMIN/GLOB SERPL: 1 {RATIO} (ref 1–1.7)
ALP SERPL-CCNC: 87 U/L (ref 46–116)
ALT SERPL-CCNC: 14 U/L (ref 14–59)
ANION GAP SERPL CALC-SCNC: 11 MMOL/L (ref 6–14)
APTT PPP: YELLOW S
AST SERPL-CCNC: 13 U/L (ref 15–37)
BACTERIA #/AREA URNS HPF: 0 /HPF
BASOPHILS # BLD AUTO: 0 X10^3/UL (ref 0–0.2)
BASOPHILS NFR BLD: 0 % (ref 0–3)
BILIRUB SERPL-MCNC: 0.5 MG/DL (ref 0.2–1)
BILIRUB UR QL STRIP: NEGATIVE
BUN SERPL-MCNC: 7 MG/DL (ref 7–20)
BUN/CREAT SERPL: 9 (ref 6–20)
CALCIUM SERPL-MCNC: 8.3 MG/DL (ref 8.5–10.1)
CHLORIDE SERPL-SCNC: 105 MMOL/L (ref 98–107)
CO2 SERPL-SCNC: 23 MMOL/L (ref 21–32)
CREAT SERPL-MCNC: 0.8 MG/DL (ref 0.6–1)
EOSINOPHIL NFR BLD: 0.1 X10^3/UL (ref 0–0.7)
EOSINOPHIL NFR BLD: 1 % (ref 0–3)
ERYTHROCYTE [DISTWIDTH] IN BLOOD BY AUTOMATED COUNT: 18 % (ref 11.5–14.5)
FIBRINOGEN PPP-MCNC: CLEAR MG/DL
GFR SERPLBLD BASED ON 1.73 SQ M-ARVRAT: 86 ML/MIN
GLOBULIN SER-MCNC: 3.7 G/DL (ref 2.2–3.8)
GLUCOSE SERPL-MCNC: 98 MG/DL (ref 70–99)
HCT VFR BLD CALC: 36.4 % (ref 36–47)
HGB BLD-MCNC: 11.7 G/DL (ref 12–15.5)
LYMPHOCYTES # BLD: 1.7 X10^3/UL (ref 1–4.8)
LYMPHOCYTES NFR BLD AUTO: 17 % (ref 24–48)
MCH RBC QN AUTO: 25 PG (ref 25–35)
MCHC RBC AUTO-ENTMCNC: 32 G/DL (ref 31–37)
MCV RBC AUTO: 77 FL (ref 79–100)
MONO #: 0.6 X10^3/UL (ref 0–1.1)
MONOCYTES NFR BLD: 6 % (ref 0–9)
NEUT #: 7.9 X10^3UL (ref 1.8–7.7)
NEUTROPHILS NFR BLD AUTO: 76 % (ref 31–73)
NITRITE UR QL STRIP: NEGATIVE
PH UR STRIP: 7 [PH]
PLATELET # BLD AUTO: 258 X10^3/UL (ref 140–400)
POTASSIUM SERPL-SCNC: 3.3 MMOL/L (ref 3.5–5.1)
PROT SERPL-MCNC: 7.4 G/DL (ref 6.4–8.2)
PROT UR STRIP-MCNC: NEGATIVE MG/DL
RBC # BLD AUTO: 4.75 X10^6/UL (ref 3.5–5.4)
RBC #/AREA URNS HPF: (no result) /HPF (ref 0–2)
SODIUM SERPL-SCNC: 139 MMOL/L (ref 136–145)
SQUAMOUS #/AREA URNS LPF: (no result) /LPF
UROBILINOGEN UR-MCNC: 1 MG/DL
WBC # BLD AUTO: 10.3 X10^3/UL (ref 4–11)
WBC #/AREA URNS HPF: (no result) /HPF (ref 0–4)

## 2019-06-23 PROCEDURE — 87491 CHLMYD TRACH DNA AMP PROBE: CPT

## 2019-06-23 PROCEDURE — 76817 TRANSVAGINAL US OBSTETRIC: CPT

## 2019-06-23 PROCEDURE — 81001 URINALYSIS AUTO W/SCOPE: CPT

## 2019-06-23 PROCEDURE — 99285 EMERGENCY DEPT VISIT HI MDM: CPT

## 2019-06-23 PROCEDURE — 84702 CHORIONIC GONADOTROPIN TEST: CPT

## 2019-06-23 PROCEDURE — 85025 COMPLETE CBC W/AUTO DIFF WBC: CPT

## 2019-06-23 PROCEDURE — 76801 OB US < 14 WKS SINGLE FETUS: CPT

## 2019-06-23 PROCEDURE — 80053 COMPREHEN METABOLIC PANEL: CPT

## 2019-06-23 PROCEDURE — 81025 URINE PREGNANCY TEST: CPT

## 2019-06-23 PROCEDURE — 87591 N.GONORRHOEAE DNA AMP PROB: CPT

## 2019-06-23 PROCEDURE — 87086 URINE CULTURE/COLONY COUNT: CPT

## 2019-06-23 PROCEDURE — 96360 HYDRATION IV INFUSION INIT: CPT

## 2019-06-23 PROCEDURE — 36415 COLL VENOUS BLD VENIPUNCTURE: CPT

## 2019-06-23 NOTE — RAD
Examination: OB <14 WKS W/TV

 

History: Right lower adnexal region pain

 

Comparison/Correlation: None

 

Findings: OB ultrasound exam was performed. Transabdominal and 

transvaginal technique were utilized. Transvaginal technique was utilized 

to better assess the adnexal structures.

 

Single living intrauterine gestation is present. Mean sac diameter of 1.58

cm corresponds 6 weeks 3 days. Crown-rump length of the fetal pole present

measures 0.38 cm corresponding to 6 weeks 0 day. No subchronic hemorrhage.

 

Yolk sac is identified.

 

Fetal heart rate is 169 bpm.

 

Uterus measures 6.2 cm by 5.3 cm x 9.2 cm in length. Myometrium is 

unremarkable. Cervical length is 3.8 cm.

 

There is no pelvic free fluid. Maternal ovaries are unremarkable with no 

evidence of torsion. Right ovary measures 2.4 cm x 2.4 cm x 2.2 cm. Left 

ovary measures 2.1 cm x 1.5 cm x 1.3 cm

 

 

Impression:

Single living intrauterine gestation corresponding to 6 weeks 0 day by 

crown-rump length.

 

Electronically signed by: Lee Kaplan MD (6/23/2019 5:43 PM) [facility]

## 2019-06-23 NOTE — PHYS DOC
Past Medical History


Past Medical History:  Anxiety, Asthma, Depression, UTI, Other


Additional Past Medical Histor:  MOOD DISORDER, TREMOR R ARM, TACHYCARDIA


Past Surgical History:  No Surgical History


Alcohol Use:  Occasionally


Drug Use:  None





Adult General


Chief Complaint


Chief Complaint:  VAGINAL BLEEDING PREGNANCY





HPI


HPI





29-year-old female presents to ER via POV for complaints of vaginal bleeding and

right lower abdominal pain. Patient states her LMP was 19 and that she is 

taken home pregnancy tests which have been positive. She denies any OB care or 

blood test for confirmation. Patient states she did have sexual intercourse 

within the past 24 hours and did have pain following intercourse. Patient 

reports 2 days ago she did have some dysuria and denies any urinary symptoms 

today. Patient reports she has had intermittent nausea and vomiting for the past

7 weeks. Patient currently rates pain at 2-3/10. Patient denies fever, diarrhea,

or lower back pain.





She reports she has been with her boyfriend since March. She reports she is in a

safe environment. She reports with this pregnancy she would be  2 para 0 

as she had an  last October.





Review of Systems


Review of Systems





Constitutional: Denies fever or chills []


Eyes: Denies change in visual acuity, redness, or eye pain []


HENT: Denies nasal congestion or sore throat []


Respiratory: Denies cough or shortness of breath []


Cardiovascular: No additional information not addressed in HPI []


GI: Denies bloody stools or diarrhea. Reports rt lower abd pain with 

intermittent N/V


: Denies hematuria. Reports dysuria 2 days ago- denies today. Reports vaginal 

bleeding


Musculoskeletal: Denies back pain or joint pain []


Integument: Denies rash or skin lesions []


Neurologic: Denies headache, focal weakness or sensory changes. Denies dizziness








All other systems were reviewed and found to be within normal limits, except as 

documented in this note.





Current Medications


Current Medications





Current Medications








 Medications


  (Trade)  Dose


 Ordered  Sig/Grecia  Start Time


 Stop Time Status Last Admin


Dose Admin


 


 Sodium Chloride  1,000 ml @ 


 1,000 mls/hr  1X  ONCE  19 15:30


 19 16:29 DC 19 15:44


1,000 MLS/HR











Allergies


Allergies





Allergies








Coded Allergies Type Severity Reaction Last Updated Verified


 


  Sulfa (Sulfonamide Antibiotics) Allergy Intermediate RASH 17 Yes


 


  ibuprofen Allergy Intermediate  10/11/18 Yes


 


  levofloxacin Allergy Intermediate HIVES 17 Yes


 


  promethazine Allergy Mild NAUSEA 17 Yes











Physical Exam


Physical Exam





Constitutional: Well developed, well nourished, no acute distress, non-toxic 

appearance. []


HENT: Normocephalic, atraumatic, oropharynx moist, no oral exudates, nose 

normal. []


Eyes: Pupils equal, conjunctiva normal, no discharge. [] 


Neck: Normal range of motion, no tenderness, supple, no stridor. [] 


Cardiovascular: Tachycardic heart rate regular rhythm- hx tachycardia and pt is 

anxious during initial exam, no murmur []


Lungs & Thorax:  Bilateral breath sounds clear to auscultation []


Abdomen: Bowel sounds normal, soft- no distention/rigidity, no masses, no 

pulsatile masses. [] 


Skin: Warm, dry, no erythema, no rash. [] 


Back: No tenderness, no CVA tenderness. [] 


Extremities: No tenderness, no cyanosis, no clubbing, ROM intact, no edema. [] 


Neurologic: Alert and oriented X 3, normal motor function, normal sensory 

function, no focal deficits noted. []


Psychologic: Affect normal, judgement normal, mood normal. []





Current Patient Data


Vital Signs





                                   Vital Signs








  Date Time  Temp Pulse Resp B/P (MAP) Pulse Ox O2 Delivery O2 Flow Rate FiO2


 


19 15:11 98.2 113 18 123/68 (86) 99 Room Air  





 98.2       








Lab Values





                                Laboratory Tests








Test


 19


15:01 19


15:25 19


15:40


 


Urine Collection Type Unknown    


 


Urine Color Yellow    


 


Urine Clarity Clear    


 


Urine pH 7.0    


 


Urine Specific Gravity 1.015    


 


Urine Protein


 Negative mg/dL


(NEG-TRACE) 


 





 


Urine Glucose (UA)


 Negative mg/dL


(NEG) 


 





 


Urine Ketones (Stick)


 Negative mg/dL


(NEG) 


 





 


Urine Blood


 Negative (NEG)


 


 





 


Urine Nitrite


 Negative (NEG)


 


 





 


Urine Bilirubin


 Negative (NEG)


 


 





 


Urine Urobilinogen Dipstick


 1.0 mg/dL (0.2


mg/dL) 


 





 


Urine Leukocyte Esterase Trace (NEG)    


 


Urine RBC


 1-2 /HPF (0-2)


 


 





 


Urine WBC


 1-4 /HPF (0-4)


 


 





 


Urine Squamous Epithelial


Cells Mod /LPF  


 


 





 


Urine Bacteria


 0 /HPF (0-FEW)


 


 





 


Urine Mucus Slight /LPF    


 


POC Urine HCG, Qualitative


 


 Hcg positive


(Negative) 





 


White Blood Count


 


 


 10.3 x10^3/uL


(4.0-11.0)


 


Red Blood Count


 


 


 4.75 x10^6/uL


(3.50-5.40)


 


Hemoglobin


 


 


 11.7 g/dL


(12.0-15.5)  L


 


Hematocrit


 


 


 36.4 %


(36.0-47.0)


 


Mean Corpuscular Volume


 


 


 77 fL ()


L


 


Mean Corpuscular Hemoglobin   25 pg (25-35)  


 


Mean Corpuscular Hemoglobin


Concent 


 


 32 g/dL


(31-37)


 


Red Cell Distribution Width


 


 


 18.0 %


(11.5-14.5)  H


 


Platelet Count


 


 


 258 x10^3/uL


(140-400)


 


Neutrophils (%) (Auto)   76 % (31-73)  H


 


Lymphocytes (%) (Auto)   17 % (24-48)  L


 


Monocytes (%) (Auto)   6 % (0-9)  


 


Eosinophils (%) (Auto)   1 % (0-3)  


 


Basophils (%) (Auto)   0 % (0-3)  


 


Neutrophils # (Auto)


 


 


 7.9 x10^3uL


(1.8-7.7)  H


 


Lymphocytes # (Auto)


 


 


 1.7 x10^3/uL


(1.0-4.8)


 


Monocytes # (Auto)


 


 


 0.6 x10^3/uL


(0.0-1.1)


 


Eosinophils # (Auto)


 


 


 0.1 x10^3/uL


(0.0-0.7)


 


Basophils # (Auto)


 


 


 0.0 x10^3/uL


(0.0-0.2)


 


Maternal Serum HCG Beta


Subunit 


 


 60966 mIU/mL


(0-5)  H


 


Sodium Level


 


 


 139 mmol/L


(136-145)


 


Potassium Level


 


 


 3.3 mmol/L


(3.5-5.1)  L


 


Chloride Level


 


 


 105 mmol/L


()


 


Carbon Dioxide Level


 


 


 23 mmol/L


(21-32)


 


Anion Gap   11 (6-14)  


 


Blood Urea Nitrogen


 


 


 7 mg/dL (7-20)





 


Creatinine


 


 


 0.8 mg/dL


(0.6-1.0)


 


Estimated GFR


(Cockcroft-Gault) 


 


 86.0  





 


BUN/Creatinine Ratio   9 (6-20)  


 


Glucose Level


 


 


 98 mg/dL


(70-99)


 


Calcium Level


 


 


 8.3 mg/dL


(8.5-10.1)  L


 


Total Bilirubin


 


 


 0.5 mg/dL


(0.2-1.0)


 


Aspartate Amino Transferase


(AST) 


 


 13 U/L (15-37)


L


 


Alanine Aminotransferase (ALT)


 


 


 14 U/L (14-59)





 


Alkaline Phosphatase


 


 


 87 U/L


()


 


Total Protein


 


 


 7.4 g/dL


(6.4-8.2)


 


Albumin


 


 


 3.7 g/dL


(3.4-5.0)


 


Albumin/Globulin Ratio   1.0 (1.0-1.7)  





                                Laboratory Tests


19 15:40








                                Laboratory Tests


19 15:40











Microbiology


19 Wet Prep - Final, Complete


          





                                Laboratory Tests








Test


 19


15:01 19


15:25 19


15:40


 


Urine Collection Type Unknown    


 


Urine Color Yellow    


 


Urine Clarity Clear    


 


Urine pH 7.0    


 


Urine Specific Gravity 1.015    


 


Urine Protein


 Negative mg/dL


(NEG-TRACE) 


 





 


Urine Glucose (UA)


 Negative mg/dL


(NEG) 


 





 


Urine Ketones (Stick)


 Negative mg/dL


(NEG) 


 





 


Urine Blood


 Negative (NEG)


 


 





 


Urine Nitrite


 Negative (NEG)


 


 





 


Urine Bilirubin


 Negative (NEG)


 


 





 


Urine Urobilinogen Dipstick


 1.0 mg/dL (0.2


mg/dL) 


 





 


Urine Leukocyte Esterase Trace (NEG)    


 


Urine RBC


 1-2 /HPF (0-2)


 


 





 


Urine WBC


 1-4 /HPF (0-4)


 


 





 


Urine Squamous Epithelial


Cells Mod /LPF  


 


 





 


Urine Bacteria


 0 /HPF (0-FEW)


 


 





 


Urine Mucus Slight /LPF    


 


POC Urine HCG, Qualitative


 


 Hcg positive


(Negative) 





 


White Blood Count


 


 


 10.3 x10^3/uL


(4.0-11.0)


 


Red Blood Count


 


 


 4.75 x10^6/uL


(3.50-5.40)


 


Hemoglobin


 


 


 11.7 g/dL


(12.0-15.5)  L


 


Hematocrit


 


 


 36.4 %


(36.0-47.0)


 


Mean Corpuscular Volume


 


 


 77 fL ()


L


 


Mean Corpuscular Hemoglobin   25 pg (25-35)  


 


Mean Corpuscular Hemoglobin


Concent 


 


 32 g/dL


(31-37)


 


Red Cell Distribution Width


 


 


 18.0 %


(11.5-14.5)  H


 


Platelet Count


 


 


 258 x10^3/uL


(140-400)


 


Neutrophils (%) (Auto)   76 % (31-73)  H


 


Lymphocytes (%) (Auto)   17 % (24-48)  L


 


Monocytes (%) (Auto)   6 % (0-9)  


 


Eosinophils (%) (Auto)   1 % (0-3)  


 


Basophils (%) (Auto)   0 % (0-3)  


 


Neutrophils # (Auto)


 


 


 7.9 x10^3uL


(1.8-7.7)  H


 


Lymphocytes # (Auto)


 


 


 1.7 x10^3/uL


(1.0-4.8)


 


Monocytes # (Auto)


 


 


 0.6 x10^3/uL


(0.0-1.1)


 


Eosinophils # (Auto)


 


 


 0.1 x10^3/uL


(0.0-0.7)


 


Basophils # (Auto)


 


 


 0.0 x10^3/uL


(0.0-0.2)


 


Maternal Serum HCG Beta


Subunit 


 


 27384 mIU/mL


(0-5)  H


 


Sodium Level


 


 


 139 mmol/L


(136-145)


 


Potassium Level


 


 


 3.3 mmol/L


(3.5-5.1)  L


 


Chloride Level


 


 


 105 mmol/L


()


 


Carbon Dioxide Level


 


 


 23 mmol/L


(21-32)


 


Anion Gap   11 (6-14)  


 


Blood Urea Nitrogen


 


 


 7 mg/dL (7-20)





 


Creatinine


 


 


 0.8 mg/dL


(0.6-1.0)


 


Estimated GFR


(Cockcroft-Gault) 


 


 86.0  





 


BUN/Creatinine Ratio   9 (6-20)  


 


Glucose Level


 


 


 98 mg/dL


(70-99)


 


Calcium Level


 


 


 8.3 mg/dL


(8.5-10.1)  L


 


Total Bilirubin


 


 


 0.5 mg/dL


(0.2-1.0)


 


Aspartate Amino Transferase


(AST) 


 


 13 U/L (15-37)


L


 


Alanine Aminotransferase (ALT)


 


 


 14 U/L (14-59)





 


Alkaline Phosphatase


 


 


 87 U/L


()


 


Total Protein


 


 


 7.4 g/dL


(6.4-8.2)


 


Albumin


 


 


 3.7 g/dL


(3.4-5.0)


 


Albumin/Globulin Ratio   1.0 (1.0-1.7)  





                                Laboratory Tests


19 15:40








                                Laboratory Tests


19 15:40











Microbiology


19 Wet Prep - Final, Complete





EKG


EKG


[]





Radiology/Procedures


Radiology/Procedures


 Pelvic Exam: RN Chaperone present 1530


 Abdomen:      Nontender


 External Genitalia:   Normal Skin no lesions/rash-swelling around vaginal 

vault. Speculum was able to be inserted without difficulty


 Speculum:      Mild erythematous vaginal mucosa, thin yellowish discharge in 

vaginal vault without blood or clots. Cervical os closed


 Bimanual:       No adnexal masses- tenderness rt adnexa, No CMT





PROCEDURE: OB <14 WKS W/TV





Examination: OB <14 WKS W/TV


 


History: Right lower adnexal region pain


 


Comparison/Correlation: None


 


Findings: OB ultrasound exam was performed. Transabdominal and 


transvaginal technique were utilized. Transvaginal technique was utilized 


to better assess the adnexal structures.


 


Single living intrauterine gestation is present. Mean sac diameter of 1.58


cm corresponds 6 weeks 3 days. Crown-rump length of the fetal pole present


measures 0.38 cm corresponding to 6 weeks 0 day. No subchronic hemorrhage.


 


Yolk sac is identified.


 


Fetal heart rate is 169 bpm.


 


Uterus measures 6.2 cm by 5.3 cm x 9.2 cm in length. Myometrium is 


unremarkable. Cervical length is 3.8 cm.


 


There is no pelvic free fluid. Maternal ovaries are unremarkable with no 


evidence of torsion. Right ovary measures 2.4 cm x 2.4 cm x 2.2 cm. Left 


ovary measures 2.1 cm x 1.5 cm x 1.3 cm


 


 


Impression:


Single living intrauterine gestation corresponding to 6 weeks 0 day by 


crown-rump length.


 


Electronically signed by: Lee Shea MD (2019 5:43 PM) [facility]














DICTATED and SIGNED BY:     LEE SHEA MD


DATE:     19 174





Course & Med Decision Making


Course & Med Decision Making


Pertinent Labs and Imaging studies reviewed. (See chart for details)





Pt was evaluated in the ER for concerns if she had been having vaginal bleeding 

and has taken home pregnancy tests which were positive. She had labs, UA 

obtained, pelvic exam, an ultrasound done while in the ER. Patient was provided 

with IV fluids for complaints of a few episodes of vomiting and initial heart 

rate was 110 although patient was quite anxious as well. On reevaluation 

patient's heart rate in the 80s and she reports she is feeling less anxious s

stephy arriving and having tests done. Patient denies any vaginal bleeding while 

in the ER. Test results were discussed with her with boyfriend at bedside.





Pt advised on keeping her scheduled appointment with her OB/GYN this week. 

Patient encouraged to increase fluid intake. Advised on over-the-counter Tylenol

 and daily prenatal vitamins. Pelvic rest education provided.





Dragon Disclaimer


Dragon Disclaimer


This electronic medical record was generated, in whole or in part, using a voice

 recognition dictation system.





Departure


Departure


Impression:  


   Primary Impression:  


   Threatened miscarriage in early pregnancy


   Additional Impression:  


   Urinary tract infection


Disposition:   HOME, SELF-CARE


Condition:  STABLE


Referrals:  


NO PCP (PCP)


Patient Instructions:  ABCs of Pregnancy, Pelvic Rest, Threatened Miscarriage, 

Urinary Tract Infection





Additional Instructions:  


Drink plenty of fluids. Well-balanced meals daily. Daily prenatal vitamins.





Keep your scheduled appointment this week with your OB/GYN for reevaluation and 

further care.


Scripts


Cephalexin (KEFLEX) 500 Mg Capsule


1 CAP PO BID, #10 CAP 0 Refills


   Prov: FOSTER BARNES         19





Problem Qualifiers











FOSTER BARNES          2019 15:38

## 2019-09-13 ENCOUNTER — HOSPITAL ENCOUNTER (EMERGENCY)
Dept: HOSPITAL 61 - ER | Age: 29
Discharge: HOME | End: 2019-09-13
Payer: MEDICAID

## 2019-09-13 VITALS — WEIGHT: 176 LBS | BODY MASS INDEX: 28.28 KG/M2 | HEIGHT: 66 IN

## 2019-09-13 VITALS — DIASTOLIC BLOOD PRESSURE: 62 MMHG | SYSTOLIC BLOOD PRESSURE: 105 MMHG

## 2019-09-13 DIAGNOSIS — O23.42: ICD-10-CM

## 2019-09-13 DIAGNOSIS — Z88.2: ICD-10-CM

## 2019-09-13 DIAGNOSIS — O21.0: Primary | ICD-10-CM

## 2019-09-13 DIAGNOSIS — Z88.1: ICD-10-CM

## 2019-09-13 DIAGNOSIS — F41.8: ICD-10-CM

## 2019-09-13 DIAGNOSIS — O99.342: ICD-10-CM

## 2019-09-13 DIAGNOSIS — Z88.6: ICD-10-CM

## 2019-09-13 DIAGNOSIS — J45.909: ICD-10-CM

## 2019-09-13 DIAGNOSIS — Z88.8: ICD-10-CM

## 2019-09-13 DIAGNOSIS — O99.512: ICD-10-CM

## 2019-09-13 DIAGNOSIS — Z3A.18: ICD-10-CM

## 2019-09-13 LAB
ALBUMIN SERPL-MCNC: 2.8 G/DL (ref 3.4–5)
ALBUMIN/GLOB SERPL: 0.6 {RATIO} (ref 1–1.7)
ALP SERPL-CCNC: 116 U/L (ref 46–116)
ALT SERPL-CCNC: 23 U/L (ref 14–59)
ANION GAP SERPL CALC-SCNC: 8 MMOL/L (ref 6–14)
APTT PPP: YELLOW S
AST SERPL-CCNC: 26 U/L (ref 15–37)
BACTERIA #/AREA URNS HPF: (no result) /HPF
BASOPHILS # BLD AUTO: 0 X10^3/UL (ref 0–0.2)
BASOPHILS NFR BLD: 0 % (ref 0–3)
BILIRUB SERPL-MCNC: 0.3 MG/DL (ref 0.2–1)
BILIRUB UR QL STRIP: NEGATIVE
BUN SERPL-MCNC: 7 MG/DL (ref 7–20)
BUN/CREAT SERPL: 10 (ref 6–20)
CALCIUM SERPL-MCNC: 8.9 MG/DL (ref 8.5–10.1)
CHLORIDE SERPL-SCNC: 105 MMOL/L (ref 98–107)
CO2 SERPL-SCNC: 25 MMOL/L (ref 21–32)
CREAT SERPL-MCNC: 0.7 MG/DL (ref 0.6–1)
EOSINOPHIL NFR BLD: 0 % (ref 0–3)
EOSINOPHIL NFR BLD: 0 X10^3/UL (ref 0–0.7)
ERYTHROCYTE [DISTWIDTH] IN BLOOD BY AUTOMATED COUNT: 17 % (ref 11.5–14.5)
FIBRINOGEN PPP-MCNC: CLEAR MG/DL
GFR SERPLBLD BASED ON 1.73 SQ M-ARVRAT: 100.4 ML/MIN
GLOBULIN SER-MCNC: 4.4 G/DL (ref 2.2–3.8)
GLUCOSE SERPL-MCNC: 87 MG/DL (ref 70–99)
HCT VFR BLD CALC: 36.5 % (ref 36–47)
HGB BLD-MCNC: 12.2 G/DL (ref 12–15.5)
LIPASE: 74 U/L (ref 73–393)
LYMPHOCYTES # BLD: 1.3 X10^3/UL (ref 1–4.8)
LYMPHOCYTES NFR BLD AUTO: 15 % (ref 24–48)
MCH RBC QN AUTO: 27 PG (ref 25–35)
MCHC RBC AUTO-ENTMCNC: 33 G/DL (ref 31–37)
MCV RBC AUTO: 81 FL (ref 79–100)
MONO #: 0.5 X10^3/UL (ref 0–1.1)
MONOCYTES NFR BLD: 6 % (ref 0–9)
NEUT #: 7 X10^3/UL (ref 1.8–7.7)
NEUTROPHILS NFR BLD AUTO: 78 % (ref 31–73)
NITRITE UR QL STRIP: NEGATIVE
PH UR STRIP: 6 [PH]
PLATELET # BLD AUTO: 197 X10^3/UL (ref 140–400)
POTASSIUM SERPL-SCNC: 4.2 MMOL/L (ref 3.5–5.1)
PROT SERPL-MCNC: 7.2 G/DL (ref 6.4–8.2)
PROT UR STRIP-MCNC: NEGATIVE MG/DL
RBC # BLD AUTO: 4.5 X10^6/UL (ref 3.5–5.4)
RBC #/AREA URNS HPF: 0 /HPF (ref 0–2)
SODIUM SERPL-SCNC: 138 MMOL/L (ref 136–145)
SQUAMOUS #/AREA URNS LPF: (no result) /LPF
UROBILINOGEN UR-MCNC: 1 MG/DL
WBC # BLD AUTO: 8.9 X10^3/UL (ref 4–11)
WBC #/AREA URNS HPF: >40 /HPF (ref 0–4)

## 2019-09-13 PROCEDURE — 96374 THER/PROPH/DIAG INJ IV PUSH: CPT

## 2019-09-13 PROCEDURE — 83690 ASSAY OF LIPASE: CPT

## 2019-09-13 PROCEDURE — 96361 HYDRATE IV INFUSION ADD-ON: CPT

## 2019-09-13 PROCEDURE — 36415 COLL VENOUS BLD VENIPUNCTURE: CPT

## 2019-09-13 PROCEDURE — 85025 COMPLETE CBC W/AUTO DIFF WBC: CPT

## 2019-09-13 PROCEDURE — 80053 COMPREHEN METABOLIC PANEL: CPT

## 2019-09-13 PROCEDURE — 99284 EMERGENCY DEPT VISIT MOD MDM: CPT

## 2019-09-13 PROCEDURE — 81001 URINALYSIS AUTO W/SCOPE: CPT

## 2019-09-13 PROCEDURE — 87086 URINE CULTURE/COLONY COUNT: CPT

## 2019-09-13 NOTE — PHYS DOC
Past Medical History


Past Medical History:  Anxiety, Asthma, Depression, UTI, Other


Additional Past Medical Histor:  MOOD DISORDER, TREMOR R ARM, TACHYCARDIA


Past Surgical History:  No Surgical History


Alcohol Use:  Occasionally


Drug Use:  None





Adult General


Chief Complaint


Chief Complaint:  VOMITING IN PREGNANCY





[facility]


HPI





Patient is a 29  year old female who presents with complaining of vomiting 

during pregnancy. Patient is  A1 at 18 weeks of gestation with LMP of May 

11, 2019 presented with complaining of nausea and then vomiting. Patient states 

she has had nausea and vomiting during her pregnancy and lost about 16 pounds 

and started on Zofran just 3 days ago but her vomiting is getting worse for the 

last 3 days she has vomiting anytime she tries to eat. Patient denies abdominal 

pain, diarrhea and constipation, urinary symptoms, fever and chills, vaginal 

bleeding or discharge. Patient states she had diarrhea for one month that 

improved Zofran. Patient had negative gallbladder ultrasound by her OB/GYN at [facility].





Review of Systems


Review of Systems





Constitutional: Denies fever or chills []


Eyes: Denies change in visual acuity, redness, or eye pain []


HENT: Denies nasal congestion or sore throat []


Respiratory: Denies cough or shortness of breath []


Cardiovascular: No additional information not addressed in HPI []


GI: Denies abdominal pain,  bloody stools or diarrhea, reports nausea and 

vomiting []


: Denies dysuria or hematuria []


Musculoskeletal: Denies back pain or joint pain []


Integument: Denies rash or skin lesions []


Neurologic: Denies headache, focal weakness or sensory changes []


Endocrine: Denies polyuria or polydipsia []





All other systems were reviewed and found to be within normal limits, except as 

documented in this note.





Current Medications


Current Medications





Current Medications








 Medications


  (Trade)  Dose


 Ordered  Sig/Grecia  Start Time


 Stop Time Status Last Admin


Dose Admin


 


 Ondansetron HCl


  (Zofran)  4 mg  1X  ONCE  19 12:15


 19 12:16 DC 19 12:51


4 MG


 


 Sodium Chloride  1,000 ml @ 


 1,000 mls/hr  Q1H  19 12:10


 19 13:09 DC 19 12:51


1,000 MLS/HR











Allergies


Allergies





Allergies








Coded Allergies Type Severity Reaction Last Updated Verified


 


  Sulfa (Sulfonamide Antibiotics) Allergy Intermediate RASH 17 Yes


 


  ibuprofen Allergy Intermediate  10/11/18 Yes


 


  levofloxacin Allergy Intermediate HIVES 17 Yes


 


  promethazine Allergy Mild NAUSEA 17 Yes











Physical Exam


Physical Exam








Constitutional: Well developed, well nourished, mild distress, non-toxic ap

pearance. []


HENT: Normocephalic, atraumatic, moist oral mucosa.


Eyes: PERRLA, EOMI, conjunctiva normal, no discharge. [] 


Neck: Normal range of motion, no tenderness, supple, no stridor. [] 


Cardiovascular:Heart rate regular rhythm, no murmur []


Lungs & Thorax:  Bilateral breath sounds clear to auscultation []


Abdomen: Bowel sounds normal, soft, no tenderness, no masses, no pulsatile 

masses, gravid abdomen without tenderness or construction, fetal heart rate 146.

 [] 


Skin: Warm, dry, no erythema, no rash. [] 


Back: No tenderness, no CVA tenderness. [] 


Extremities: No tenderness, no cyanosis, no clubbing, ROM intact, no edema. [] 


Neurologic: Alert and oriented X 3, no focal deficits noted. []


Psychologic: Affect normal, judgement normal, mood normal. []





Current Patient Data


Vital Signs





                                   Vital Signs








  Date Time  Temp Pulse Resp B/P (MAP) Pulse Ox O2 Delivery O2 Flow Rate FiO2


 


19 14:06  72 16 105/62 (76) 100 Room Air  


 


19 12:00 98.3       





 98.3       








Lab Values





                                Laboratory Tests








Test


 19


12:30 19


12:50


 


Urine Collection Type Unknown   


 


Urine Color Yellow   


 


Urine Clarity Clear   


 


Urine pH 6.0   


 


Urine Specific Gravity 1.025   


 


Urine Protein


 Negative mg/dL


(NEG-TRACE) 





 


Urine Glucose (UA)


 Negative mg/dL


(NEG) 





 


Urine Ketones (Stick)


 Negative mg/dL


(NEG) 





 


Urine Blood


 Negative (NEG)


 





 


Urine Nitrite


 Negative (NEG)


 





 


Urine Bilirubin


 Negative (NEG)


 





 


Urine Urobilinogen Dipstick


 1.0 mg/dL (0.2


mg/dL) 





 


Urine Leukocyte Esterase Large (NEG)   


 


Urine RBC 0 /HPF (0-2)   


 


Urine WBC


 >40 /HPF (0-4)


 





 


Urine Squamous Epithelial


Cells Many /LPF  


 





 


Urine Bacteria


 Many /HPF


(0-FEW) 





 


Urine Mucus Marked /LPF   


 


White Blood Count


 


 8.9 x10^3/uL


(4.0-11.0)


 


Red Blood Count


 


 4.50 x10^6/uL


(3.50-5.40)


 


Hemoglobin


 


 12.2 g/dL


(12.0-15.5)


 


Hematocrit


 


 36.5 %


(36.0-47.0)


 


Mean Corpuscular Volume


 


 81 fL ()





 


Mean Corpuscular Hemoglobin  27 pg (25-35)  


 


Mean Corpuscular Hemoglobin


Concent 


 33 g/dL


(31-37)


 


Red Cell Distribution Width


 


 17.0 %


(11.5-14.5)  H


 


Platelet Count


 


 197 x10^3/uL


(140-400)


 


Neutrophils (%) (Auto)  78 % (31-73)  H


 


Lymphocytes (%) (Auto)  15 % (24-48)  L


 


Monocytes (%) (Auto)  6 % (0-9)  


 


Eosinophils (%) (Auto)  0 % (0-3)  


 


Basophils (%) (Auto)  0 % (0-3)  


 


Neutrophils # (Auto)


 


 7.0 x10^3/uL


(1.8-7.7)


 


Lymphocytes # (Auto)


 


 1.3 x10^3/uL


(1.0-4.8)


 


Monocytes # (Auto)


 


 0.5 x10^3/uL


(0.0-1.1)


 


Eosinophils # (Auto)


 


 0.0 x10^3/uL


(0.0-0.7)


 


Basophils # (Auto)


 


 0.0 x10^3/uL


(0.0-0.2)


 


Sodium Level


 


 138 mmol/L


(136-145)


 


Potassium Level


 


 4.2 mmol/L


(3.5-5.1)


 


Chloride Level


 


 105 mmol/L


()


 


Carbon Dioxide Level


 


 25 mmol/L


(21-32)


 


Anion Gap  8 (6-14)  


 


Blood Urea Nitrogen


 


 7 mg/dL (7-20)





 


Creatinine


 


 0.7 mg/dL


(0.6-1.0)


 


Estimated GFR


(Cockcroft-Gault) 


 100.4  





 


BUN/Creatinine Ratio  10 (6-20)  


 


Glucose Level


 


 87 mg/dL


(70-99)


 


Calcium Level


 


 8.9 mg/dL


(8.5-10.1)


 


Total Bilirubin


 


 0.3 mg/dL


(0.2-1.0)


 


Aspartate Amino Transferase


(AST) 


 26 U/L (15-37)





 


Alanine Aminotransferase (ALT)


 


 23 U/L (14-59)





 


Alkaline Phosphatase


 


 116 U/L


()


 


Total Protein


 


 7.2 g/dL


(6.4-8.2)


 


Albumin


 


 2.8 g/dL


(3.4-5.0)  L


 


Albumin/Globulin Ratio


 


 0.6 (1.0-1.7)


L


 


Lipase


 


 74 U/L


()





                                Laboratory Tests


19 12:50








                                Laboratory Tests


19 12:50











EKG


EKG


[]





Radiology/Procedures


Radiology/Procedures


[]





Course & Med Decision Making


Course & Med Decision Making


Pertinent Labs reviewed. (See chart for details)





Evaluation of patient in ER showed 29-year-old female patient at 18 weeks of 

gestation with complaining of increasing nausea and vomiting for the last 3 days

 without vaginal bleeding or abdominal pain or fever and chills. Patient had 

unremarkable physical exam and labs except for UTI. Patient tolerated oral 

intake without problem. Patient was advised to continue Zofran and prescription 

for Keflex for UTI was given.





Dragon Disclaimer


Dragon Disclaimer


This electronic medical record was generated, in whole or in part, using a voice

 recognition dictation system.





Departure


Departure


Impression:  


   Primary Impression:  


   Hyperemesis gravidarum


   Additional Impression:  


   Urinary tract infection during pregnancy


Disposition:  01 HOME, SELF-CARE (at 1400)


Condition:  IMPROVED


Referrals:  


UNKNOWN PCP NAME (PCP)


Patient Instructions:  Diet - Hyperemesis Gravidarum, Hyperemesis Gravidarum, 

Urinary Tract Infection





Additional Instructions:  


Drink plenty of liquids


Follow-up with your OB/GYN physician in 3-5 days


Return to ER if not getting better


Continue home Zofran


Scripts


Cephalexin (KEFLEX) 500 Mg Capsule


2 CAP PO Q12HR, #28 CAP


   Prov: ALEXIS BROCK MD         19





Problem Qualifiers








   Additional Impression:  


   Urinary tract infection during pregnancy


   Trimester:  second trimester  Qualified Codes:  O23.42 - Unspecified inf

   ection of urinary tract in pregnancy, second trimester








ALEXIS BROCK MD             Sep 13, 2019 12:45